# Patient Record
Sex: FEMALE | NOT HISPANIC OR LATINO | Employment: UNEMPLOYED | URBAN - METROPOLITAN AREA
[De-identification: names, ages, dates, MRNs, and addresses within clinical notes are randomized per-mention and may not be internally consistent; named-entity substitution may affect disease eponyms.]

---

## 2019-05-07 ENCOUNTER — HOSPITAL ENCOUNTER (EMERGENCY)
Facility: HOSPITAL | Age: 29
Discharge: HOME | End: 2019-05-07
Attending: EMERGENCY MEDICINE

## 2019-05-07 VITALS
OXYGEN SATURATION: 99 % | TEMPERATURE: 98 F | WEIGHT: 180 LBS | HEART RATE: 89 BPM | RESPIRATION RATE: 18 BRPM | BODY MASS INDEX: 33.13 KG/M2 | DIASTOLIC BLOOD PRESSURE: 63 MMHG | SYSTOLIC BLOOD PRESSURE: 130 MMHG | HEIGHT: 62 IN

## 2019-05-07 DIAGNOSIS — N93.8 DYSFUNCTIONAL UTERINE BLEEDING: Primary | ICD-10-CM

## 2019-05-07 DIAGNOSIS — N92.1 MENORRHAGIA WITH IRREGULAR CYCLE: ICD-10-CM

## 2019-05-07 LAB
B-HCG UR QL: NEGATIVE
BILIRUB UR QL STRIP: NEGATIVE
CLARITY UR: ABNORMAL
COLOR UR: YELLOW
CTP QC/QA: YES
GLUCOSE UR QL STRIP: NEGATIVE
HGB UR QL STRIP: ABNORMAL
KETONES UR QL STRIP: NEGATIVE
LEUKOCYTE ESTERASE UR QL STRIP: NEGATIVE
MICROSCOPIC COMMENT: ABNORMAL
NITRITE UR QL STRIP: NEGATIVE
PH UR STRIP: 6 [PH] (ref 5–8)
PROT UR QL STRIP: NEGATIVE
RBC #/AREA URNS HPF: 30 /HPF (ref 0–4)
SP GR UR STRIP: 1.02 (ref 1–1.03)
URN SPEC COLLECT METH UR: ABNORMAL
UROBILINOGEN UR STRIP-ACNC: NEGATIVE EU/DL

## 2019-05-07 PROCEDURE — 81000 URINALYSIS NONAUTO W/SCOPE: CPT

## 2019-05-07 PROCEDURE — 99284 EMERGENCY DEPT VISIT MOD MDM: CPT | Mod: 25

## 2019-05-07 PROCEDURE — 96372 THER/PROPH/DIAG INJ SC/IM: CPT

## 2019-05-07 PROCEDURE — 81025 URINE PREGNANCY TEST: CPT | Performed by: EMERGENCY MEDICINE

## 2019-05-07 PROCEDURE — 63600175 PHARM REV CODE 636 W HCPCS: Performed by: EMERGENCY MEDICINE

## 2019-05-07 PROCEDURE — 25000003 PHARM REV CODE 250: Performed by: EMERGENCY MEDICINE

## 2019-05-07 RX ORDER — KETOROLAC TROMETHAMINE 30 MG/ML
30 INJECTION, SOLUTION INTRAMUSCULAR; INTRAVENOUS
Status: COMPLETED | OUTPATIENT
Start: 2019-05-07 | End: 2019-05-07

## 2019-05-07 RX ORDER — NAPROXEN 500 MG/1
500 TABLET ORAL 2 TIMES DAILY WITH MEALS
Qty: 30 TABLET | Refills: 0 | Status: SHIPPED | OUTPATIENT
Start: 2019-05-07

## 2019-05-07 RX ORDER — DICYCLOMINE HYDROCHLORIDE 10 MG/1
20 CAPSULE ORAL
Status: COMPLETED | OUTPATIENT
Start: 2019-05-07 | End: 2019-05-07

## 2019-05-07 RX ORDER — DICYCLOMINE HYDROCHLORIDE 20 MG/1
20 TABLET ORAL EVERY 6 HOURS PRN
Qty: 20 TABLET | Refills: 1 | Status: SHIPPED | OUTPATIENT
Start: 2019-05-07 | End: 2019-06-06

## 2019-05-07 RX ADMIN — KETOROLAC TROMETHAMINE 30 MG: 30 INJECTION, SOLUTION INTRAMUSCULAR at 06:05

## 2019-05-07 RX ADMIN — DICYCLOMINE HYDROCHLORIDE 20 MG: 10 CAPSULE ORAL at 06:05

## 2019-05-07 NOTE — ED NOTES
Pt presents to the ED with c/o abdominal pain x5 days. Pt states she did not have a menstrual cycle for 2 months but started her cycle on Thursday and the pain began at the same time. Pt reports she has an IUD. Also reports dysuria and nausea. Denies fever, vomiting and diarrhea. Last BM yesterday. Reports taking tylenol with no relief.

## 2019-05-07 NOTE — ED PROVIDER NOTES
Encounter Date: 5/7/2019       History     Chief Complaint   Patient presents with    Abdominal Pain     patient presents to the ED with reports of having abdominal pain that started approximately x 5 days ago with the onset of her cycle. Denies any vomiting or diarrhea.      Patient speaks Thai.  The [employer] was used to translate      29-year-old female coming in today complaining of some heavy menstrual bleeding and lower abdominal pain and cramping.  Patient notes she recently moved here a few months ago.  She previously was living in [place].  She had an IUD placed about 2 years ago by her gynecologist there.  Her last menstrual cycle was 2 months ago.  She skipped last month and this month she start bleeding again and having some lower abdominal pain and some cramping.  Rates the pain as mild to moderate.  Denies a radiate anywhere else.  She points to her lower suprapubic region.  No fevers chills. No recent sickness.  She also states she has had some dysuria symptoms.  Denies she could be pregnant today.  No syncope no LOC.  All the symptoms only began after she started having her menstrual cycle.  Patient here to this denies any other acute modifying factors at this time.        Review of patient's allergies indicates:  No Known Allergies  No past medical history on file.  No past surgical history on file.  No family history on file.  Social History     Tobacco Use    Smoking status: Not on file   Substance Use Topics    Alcohol use: Not on file    Drug use: Not on file     Review of Systems   Constitutional:        ----I have reviewed the PMHx, PSHx, FamHX, social hx, allergies and other nursing notes and agree with the nursing notes.         Constitutional: Negative for fever, negative for chills, negative for weight loss    Eyes: Negative for visual changes, negative for eye pain, negative for discharge.    ENMT: Negative for hearing changes, negative for ear pain, negative for ear discharge.  negative for neck pain, negative for neck stiffness. negative for epistaxis, negative for rhinorrhea.    Cardiac: Negative for chest pain, negative for palpitations, negative for lower extremity edema.     Respiratory: Negative for cough, negative for shortness of breath.    GI: Negative for nausea, negative for vomiting, negative for diarrhea, see HPI. negative for rectal bleeding.    :  See HPI negative for frequency, negative for hematuria.    MS: Negative for muscle weakness, negative for joint pain, negative for back pain.    Neuro: Negative for headache, negative for focal weakness. negative for LOC. Denies any trauma.    Skin: Negative for skin rash.     Endocrine: Negative for polyuria, negative for polydypsia.         Physical Exam     Initial Vitals [05/07/19 0411]   BP Pulse Resp Temp SpO2   105/66 93 18 98.5 °F (36.9 °C) 96 %      MAP       --         Physical Exam    Constitutional:   Constitutional: A+Ox4, NAD    Eyes: PERRLA, EOMI, Negative for scleral erythema, Negative for eye discharge, Negative for periorbital swelling.    ENT: Tympanic membranes are normal in appearance without erythema bilaterally, Negative for external canal exudate and bleeding, Pharynx: Negative for erythema; no exudate noted; Negative for rhinorrhea.    Neck: Negative for neck stiffness, Negative for signs of meningismus.    Heart: RRR without M/G/R    Lungs: CTA-B, Negative for wheezes, stridor, or respiratory distress.    Abdomen: Soft, Bowel sounds x4, Negative for distension, Negative for tenderness in all 4 quadrants, Negative for guarding, Negative for rebound, Negative for rigidity, NOT a surgical acute abdomen.    Back: No costovertebral angle tenderness noted.    Skin: Warm, dry, intact.     MS: Full ROM in all ext.    Psych: Mood appropriate.    Neuro: 5/5 strength in all extremities. 2+ distal pulses and 2+ sensation throughout all extremities. Negative for slurred speech, Negative for facial droop. Negative for  any focal neuro deficits. Gait: Walks with a steady gait.          ----Parts of this note were created using [place] voice recognition software program. Significant efforts were made to correct any mistakes made by this voice recognition software program however some mistakes, errors, or omissions may remain in the note that were not caught when the note was originally created.         ED Course   Procedures  Labs Reviewed   URINALYSIS, REFLEX TO URINE CULTURE - Abnormal; Notable for the following components:       Result Value    Appearance, UA Hazy (*)     Occult Blood UA 3+ (*)     All other components within normal limits    Narrative:     Preferred Collection Type->Urine, Clean Catch   URINALYSIS MICROSCOPIC - Abnormal; Notable for the following components:    RBC, UA 30 (*)     All other components within normal limits    Narrative:     Preferred Collection Type->Urine, Clean Catch   POCT URINE PREGNANCY          Imaging Results    None          Medical Decision Making:   ED Management:  29-year-old female coming in today with some heavy menstrual bleeding and pelvic pain. Likely this is due to IUD which has been place for over 2 years and patient missing her menstrual cycle last month.  She has not been established with the gynecology physician here.  Will provide her with resources so she can follow up closely.  She received Toradol Bentyl here.  I will discharge her home with the same.  Patient was instructed follow up closely with gynecology as an outpatient.  Full workup and evaluation hears negative for anything acute.  No signs of acute urinary tract infection.  In addition she is not pregnant here.  Patient here otherwise looks well and as result patient will be discharged home.                      Clinical Impression:       ICD-10-CM ICD-9-CM   1. Dysfunctional uterine bleeding N93.8 626.8   2. Menorrhagia with irregular cycle N92.1 626.2         Disposition:   Disposition: Discharged  Condition:  Stable                        John Escobar MD  05/07/19 0549       John Escobar MD  05/07/19 0551

## 2019-05-07 NOTE — ED NOTES
Pt informed of possible IM injection reactions. Instructed to report any symptoms of reactions immediately. Call light within reach